# Patient Record
Sex: FEMALE | Race: WHITE | URBAN - METROPOLITAN AREA
[De-identification: names, ages, dates, MRNs, and addresses within clinical notes are randomized per-mention and may not be internally consistent; named-entity substitution may affect disease eponyms.]

---

## 2020-03-20 ENCOUNTER — OFFICE VISIT (OUTPATIENT)
Dept: FAMILY MEDICINE CLINIC | Age: 50
End: 2020-03-20

## 2020-03-20 VITALS
BODY MASS INDEX: 29.98 KG/M2 | TEMPERATURE: 96.7 F | RESPIRATION RATE: 14 BRPM | DIASTOLIC BLOOD PRESSURE: 69 MMHG | OXYGEN SATURATION: 99 % | SYSTOLIC BLOOD PRESSURE: 119 MMHG | HEART RATE: 64 BPM | HEIGHT: 67 IN | WEIGHT: 191 LBS

## 2020-03-20 DIAGNOSIS — R21 RASH: Primary | ICD-10-CM

## 2020-03-20 DIAGNOSIS — R10.816 EPIGASTRIC ABDOMINAL TENDERNESS WITHOUT REBOUND TENDERNESS: ICD-10-CM

## 2020-03-20 DIAGNOSIS — I83.93 VARICOSE VEINS OF BOTH LOWER EXTREMITIES, UNSPECIFIED WHETHER COMPLICATED: ICD-10-CM

## 2020-03-20 RX ORDER — MINERAL OIL
180 ENEMA (ML) RECTAL DAILY
Qty: 90 TAB | Refills: 1 | Status: SHIPPED | OUTPATIENT
Start: 2020-03-20

## 2020-03-20 RX ORDER — PANTOPRAZOLE SODIUM 40 MG/1
40 TABLET, DELAYED RELEASE ORAL DAILY
Qty: 30 TAB | Refills: 0 | Status: SHIPPED | OUTPATIENT
Start: 2020-03-20 | End: 2020-03-25

## 2020-03-20 NOTE — PROGRESS NOTES
1. Have you been to the ER, urgent care clinic since your last visit? Hospitalized since your last visit? No    2. Have you seen or consulted any other health care providers outside of the [address] [place] [address] since your last visit? Include any pap smears or colon screening.  No

## 2020-03-20 NOTE — PATIENT INSTRUCTIONS
Várices: Instrucciones de cuidado Varicose Veins: Care Instructions Instrucciones de cuidado Las várices son Merary Nares que quinones aumentado de [place] y se quinones retorcido cerca de la superficie de la piel. Aparecen con mayor frecuencia en las piernas y los tobillos. Algunas personas podrían ser más propensas que otras a las várices debido al envejecimiento, los cambios hormonales o porque karissa de los padres las tienen. El exceso de peso o el embarazo pueden empeorarlas. Los trabajos que requieren permanecer de pie scott mucho tiempo también pueden empeorarlas. La atención de seguimiento es william parte clave de mcdonald tratamiento y seguridad. Asegúrese de hacer y acudir a todas las citas, y llame a mcdonald médico si está teniendo problemas. También es william buena idea saber los resultados de toni exámenes y mantener william lista de los medicamentos que gabriela. Cómo puede cuidarse en el hogar? · Use medias de compresión scott el día para ayudar a [employer]. Mejoran el flujo de [place] y son el tratamiento principal para las várices. Hable con mcdonald médico sobre qué tipo de medias debe comprar y dónde puede conseguirlas. · Eleve las piernas a la altura o por encima del nivel del corazón cuando sea posible. [place] ayuda a evitar que la reba se acumule en la parte inferior de las piernas y mejora el flujo de reba al chio del cuerpo. · Evite permanecer de pie o sentado scott mucho tiempo. [place] aumenta la tensión [employer]. · Barrera ejercicio de Daria regular y controle mcdonald peso. Janneth, aleks en bicicleta o nade para mejorar el flujo de [employer] piernas. · Si se golpea la pierna con tanta fuerza que sabe que le va a salir un moretón, elévela y aplíquese hielo o william compresa fría en la chyna por entre 10 y 21 minutos cada vez. Trate de hacerlo cada 1 o 2 horas scott los siguientes 3 días (cuando esté despierto) o hasta que la hinchazón baje. Póngase un paño hussein entre el hielo y la piel. · Si se corta o se raspa la piel sobre william vena, podría sangrar bastante. Eleve la pierna y aplique presión firme con william venda limpia sobre el lugar del sangrado. Siga aplicando presión scott 15 minutos. No intente revisar antes de michael tiempo si el sangrado se ha detenido. Si el sangrado no se ha detenido después de 15 minutos, aplique presión de [employer] otros 15 minutos. Puede repetir esto hasta 3 veces scott un total de 45 minutos. Si tiene un coágulo de reba en william várice, podría tener sensibilidad e hinchazón sobre la vena. La vena podría sentirse rígida. Asegúrese de llamar a mcdonald médico de inmediato si tiene estos síntomas. Si mcdonald médico le ha dicho cómo debe cuidar el coágulo, siga toni instrucciones. El cuidado puede incluir lo siguiente: · Eleve la pierna y aplique calor con un paño tibio y húmedo o william almohadilla térmica a baja temperatura (póngase william toalla o paño entre la pierna y la almohadilla para prevenir quemaduras). · Pregúntele a mcdonald médico si puede herbert un analgésico (medicamento para el dolor) de venta gaye, troy acetaminofén (Tylenol), ibuprofeno (Advil, Motrin) o naproxeno (Aleve). Sea stewart con los medicamentos. Erica y siga todas las instrucciones de la [place]. Cuándo debe pedir ayuda? Llame al 911 en cualquier momento que considere que necesita atención de [place]. Por ejemplo, llame si: 
  · Tiene dolor repentino en el pecho y falta de [place], o tose reba.  
 Llame a mcdonald médico ahora mismo o busque atención médica inmediata si: 
  · Tiene señales de un coágulo de [place], troy: ? Dolor en la pantorrilla, el muslo, la tori o detrás de la rodilla. ?  Enrojecimiento e hinchazón en la pierna o la tori.  
  · William várice comienza a sangrar y no puede detener el sangrado.  
  · Tiene un bulto adolorido en la pierna.  
  · Tiene william llaga abierta.  
 Preste especial atención a los cambios en mcdonald jermaine y asegúrese de comunicarse con mcdonald médico si: 
   · Janie síntomas de las várices no mejoran con el tratamiento en el hogar. Dónde puede encontrar más información en inglés? Catherine White a http://[email or web address].info/ Zofia Kassidy U591 en la búsqueda para aprender Canda Lie de \"Várices: Instrucciones de cuidado. \" Revisado: 14 julio, 2019Versión del contenido: 12.4 © 9724-8084 Healthwise, Incorporated. Las instrucciones de cuidado fueron adaptadas bajo licencia por [employer] (which disclaims liability or warranty for this information). Si usted tiene [place] afección médica o sobre estas instrucciones, siempre pregunte a mcdonald profesional de jermaine. Healthwise, Incorporated niega toda garantía o responsabilidad por mcdonald uso de esta información. Análisis de reba para alergias: Acerca de estos análisis Allergy Blood Tests: About These Tests 

Qué son? Los análisis de reba para las alergias sirven para detectar sustancias (anticuerpos) en la reba que el organismo produce en reacción a los alérgenos. Los alérgenos son cosas a las que el organismo tiene alergia. Hay varios tipos de análisis de [place] para las [place], [place] los siguientes: 
· Ensayo inmunoabsorbente ligado a enzimas (MARIE, EIA). · Pruebas radioalergoabsorbentes (RAST). · Prueba de inmunoensayo de captura (ImmunoCAP, UniCAP o Pharmacia CAP). · Pruebas fluor-alergoabsorbentes (FAST). · Prueba simultánea de antígenos múltiples (MAST). Why are these tests done? Los análisis de reba para alergias se hacen para averiguar a qué cosas tiene usted alergia. Cómo se hacen estas pruebas? Un profesional de la jermaine [place] william aguja para herbert william [place] de [place], por lo general, del Rita Jesus. Qué ocurre después de estas pruebas? Los [employer] análisis de reba para la detección de alergias por lo general están disponibles en unos 7 días. Dónde puede encontrar más información en inglés? Catherine White a http://[email or web address].info/ Escriba T573 en la búsqueda para aprender más acerca de \"Análisis de reba para alergias: Acerca de [employer]. \" Revisado: 6 octubre, 2019Versión del contenido: 12.4 © 0188-8498 Healthwise, Incorporated. Las instrucciones de cuidado fueron adaptadas bajo licencia por [employer] Help Conne[employer] (which disclaims liability or warranty for this information). Si usted tiene [place] afección médica o sobre estas instrucciones, siempre pregunte a mcdonald profesional de jermaine. Healthwise, Incorporated niega toda garantía o responsabilidad por mcdonald uso de esta información. Gastritis: Instrucciones de cuidado Gastritis: Care Instructions Instrucciones de cuidado La gastritis es dolor y malestar estomacal. Sucede cuando algo irrita el revestimiento del [facility]. Hay muchas cosas que pueden causarla. Entre estas se incluyen william infección troy la gripe o algo que ha comido o bebido. Los medicamentos o william llaga en el recubrimiento del estómago ([place]) también pueden causarla. Puede tener abotagamiento y dolor abdominal. Podría eructar, vomitar y tener revoltura estomacal. 
Usted debería poder aliviar el problema tomando medicamentos. Y delgado vez sería útil cambiar la alimentación. Si la gastritis continúa, mcdonald médico podría recetarle medicamentos. La atención de seguimiento es william parte clave de mcdonald tratamiento y seguridad. Asegúrese de hacer y acudir a todas las citas, y llame a mcdonald médico si está teniendo problemas. También es william buena idea saber los resultados de toni exámenes y mantener william lista de los medicamentos que gabriela. Cómo puede cuidarse en el hogar? · Si mcdonald médico le recetó antibióticos, tómelos según las indicaciones. No deje de tomarlos por el hecho de sentirse mejor. Debe herbert todos los antibióticos hasta terminarlos. · Sea stewart con los medicamentos. Si mcdonald médico le recetó un medicamento para reducir el ácido estomacal, tómelo según las indicaciones.  Llame a mcdonald médico si se estar teniendo problemas con mcdonald medicamento. · No tome ningún otro medicamento, incluyendo analgésicos (medicamentos para el dolor) de venta gaye, sin consultar con mcdonald médico reema. · Si mcdonald médico le recomienda medicamentos de venta gaye para reducir el ácido estomacal, tales troy Pepcid AC, Prilosec, Tagamet HB o Zantac 75, siga las instrucciones de la etiqueta. · Ana abundantes líquidos (los suficientes troy para que mcdonald orina sea de color amarillo oscar o transparente troy el agua) para prevenir la deshidratación. Elija herbert agua y otros líquidos jose sin cafeína. Si tiene [facility] [employer], el corazón o el hígado y tiene que [place]'s líquidos, hable con mcdonald médico antes de aumentar mcdonald consumo. · Limite la cantidad de alcohol que cruzito. · Evite el café, el té, las bebidas de cola, el chocolate y otros alimentos que contengan cafeína. Aumentan el ácido estomacal. 

Cuándo debe pedir ayuda? Llame al 911 en cualquier momento que considere que necesita atención de [place]. Por ejemplo, llame si: 
  · Vomita reba o algo parecido a granos de café molido.  
  · Janie heces son de color rojizo o muy sanguinolentas (con reba).  
 Llame a mcdonald médico ahora mismo o busque atención médica inmediata si: 
  · Empieza a respirar en forma acelerada y no ha producido orina en las últimas 8 horas.  
  · No puede retener líquidos en el estómago.  
 Preste especial atención a los cambios en mcdonald jermaine y asegúrese de comunicarse con mcdonald médico si: 
  · No mejora troy se esperaba. Dónde puede encontrar más información en inglés? Catherine White a http://[email or web address].info/ Zofia Yi E144 en la búsqueda para aprender más acerca de \"Gastritis: Instrucciones de cuidado. \" Revisado: 11 agosto, 2019Versión del contenido: 12.4 © 7002-1136 Healthwise, Incorporated.  
Las instrucciones de cuidado fueron adaptadas bajo licencia por [employer] Help Conne[employer] (which disclaims liability or warranty for this information). Si usted tiene [place] afección médica o sobre estas instrucciones, siempre pregunte a mcdonald profesional de jermaine. [facility], Incorporated niega toda garantía o responsabilidad por mcdonald uso de esta información. Urticaria: Instrucciones de cuidado Hives: Care Instructions Instrucciones de cuidado La urticaria consiste en manchas rojizas y elevadas en la piel que producen comezón. También se les llama ronchas. Por lo general, tienen los bordes rojizos y el interior pálido. Las ronchas varían en tamaño desde ¼ de pulgada (0.5 cm) a 3 pulgadas (7.5 cm) o más de ancho. Puede parecer que se desplazan de un lugar a otro en la piel. Varias ronchas podrían formar william chyna eliane de piel enrojecida y [place]. Puede tener urticaria después de la picadura de un insecto, herbert un medicamento o comer ciertos alimentos, o debido a william infección o el estrés. [address] [place] plantas, cosas que usted respira, el [place], el calor, el frío, la ceci del sol y el látex. Usted no puede contagiar la urticaria a otras personas. Las ronchas pueden durar de unos pocos minutos a unos cuantos días, jackie william melissa hernandez podría durar menos de 36 horas. La atención de seguimiento es william parte clave de mcdonald tratamiento y seguridad. Asegúrese de hacer y acudir a todas las citas, y llame a mcdonald médico si está teniendo problemas. También es william buena idea saber los resultados de toni exámenes y mantener william lista de los medicamentos que gabriela. Cómo puede cuidarse en el hogar? · Evite cualquier cosa que piense que pudiera traci causado la urticaria, troy ciertos alimentos o medicamentos. Sin embargo, es posible que no conozca la causa. · Colóquese william toalla húmeda fría sobre la chyna afectada para aliviar la comezón.  
· [place] un antihistamínico de venta [place], troy difenhidramina (Benadryl), cetirizina (Zyrtec) o loratadina (Claritin), para ayudar a detener la urticaria y calmar la comezón. Erica y siga las instrucciones de la etiqueta. Estos medicamentos le pueden provocar sueño. No conduzca mientras esté tomando estos medicamentos. · Manténgase alejado de los [place], detergentes y sustancias químicas christel. Estos pueden empeorar la comezón. Cuándo debes pedir ayuda? [place] al 911 en cualquier momento que consideres que necesitas atención de [place]. Por ejemplo, llama si: 
  · Tienes síntomas de william reacción alérgica grave. Estos pueden incluir: 
? Zonas elevadas y enrojecidas (ronchas) que aparecen repentinamente por todo el cuerpo. ? Hinchazón de la garganta, la boca, los labios o la [place]. ? Dificultad para respirar. ? Pérdida del conocimiento [facility]). O podrías sentirte muy aturdido o de repente sentirte débil, confuso o agitado.  
 Llama a tu médico ahora mismo o busca atención médica inmediata si: 
  · Tienes síntomas de william reacción alérgica, tales troy: 
? Salpullido o ronchas (zonas elevadas y enrojecidas en la piel). ? Comezón. ? [place] Chr[place]. ? Dolor abdominal, náuseas o vómito.  
  · Tienes ronchas después de empezar a herbert un nuevo medicamento.  
  · Las ronchas no desaparecen después de 24 horas.  
 Presta especial atención a los cambios en tu jermaine y asegúrate de comunicarte con tu médico si: 
  · No mejoras troy se esperaba. Dónde puede encontrar más información en inglés? Jaswant warner [email or web address].info/ Paul Lux Q042 en la búsqueda para aprender más acerca de \"Urticaria: Instrucciones de cuidado. \" Revisado: 26 junio, 2019Versión del contenido: 12.4 © 8585-9143 Healthwise, DNAe LTD. Las instrucciones de cuidado fueron adaptadas bajo licencia por Good Help Conne[employer] (which disclaims liability or warranty for this information).  Si usted tiene [place] afección médica o sobre estas instrucciones, siempre pregunte a mcdonald profesional de jermaine. [facility], Incorporated niega toda garantía o responsabilidad por mcdonald uso de esta información.

## 2020-03-20 NOTE — PROGRESS NOTES
[facility]    CC: EOC for Rash    HPI:     Rash:  - Timing/onset: Issue started 1-2 years ago  - Location: Can occur anywhere in her body  - Quality: Raised and red   - Progression/Course: Unchanged  - Associated Symptoms/signs: Itching  - Alleviating factors: Zyrtec      Abdominal Pain:  -Started 1 month ago  -Pain is in epigastric region  -Associated with pain going up her throat      ROS: Positive items marked in RED  CON: fever, chills  Cardiovascular: palpitations, CP  Resp: SOB, cough  GI: nausea, vomiting, diarrhea  : dysuria, hematuria      History reviewed. No pertinent past medical history. Past Surgical History:   Procedure Laterality Date    BREAST SURGERY PROCEDURE UNLISTED         Family History   Problem Relation Age of Onset    Cancer Father        Social History     Socioeconomic History    Marital status:      Spouse name: Not on file    Number of children: Not on file    Years of education: Not on file    Highest education level: Not on file   Tobacco Use    Smoking status: Never Smoker    Smokeless tobacco: Never Used   Substance and Sexual Activity    Alcohol use: Not Currently     Frequency: Never    Drug use: Never    Sexual activity: Yes     Partners: Male       No Known Allergies    No current outpatient medications on file.     Physical Exam:      /69   Pulse 64   Temp 96.7 °F (35.9 °C) (Oral)   Resp 14   Ht 5' 7\" (1.702 m)   Wt 191 lb (86.6 kg)   LMP 02/13/2020   SpO2 99%   BMI 29.91 kg/m²     General:  WD, WN, NAD, conversant  Eyes: sclera clear bilaterally, no discharge noted, eyelids normal in appearance  HENT: NCAT  Lungs: CTAB, normal respiratory effort and rate  CV: RRR, no MRGs  ABD: soft, epigastric region mildly tender deep palpation, no rebound tenderness, no guarding, non-distended, normal bowel sounds  Skin: several varicose veins noted in lower extremities bilaterally, normal temperature, turgor, color, and texture  Psych: alert and oriented to person, place and situation, normal affect  Neuro: speech normal, moving all extremities, gait normal      Assessment/Plan     Rash:  -Suspect she has urticaria secondary to some unknown allergen  -Counseled on suspected cause/diagnosis  -Food and airborne allergen profiles ordered  -Started on Allegra regimen  -Handout given on urticaria care and allergy blood test  -Follow-up in 1 month      Epigastric Abdominal Tenderness:  -Suspect she has gastritis  -H. pylori breath test ordered  -Started on Protonix regimen  -Handout given on gastritis care  -Follow-up in 1 month      Varicose Veins of Both Lower Extremities:  -Referred to vascular center for evaluation  -Handout given on varicose vein care  -Follow-up in 1 month        Genevieve Bradley MD  3/20/2020, 11:18 AM

## 2020-03-23 LAB
ALMOND (F20), 16205: 0.28 KU/L
ALTERNARIA ALTERNATA,M6A: <0.1 KU/L
BERMUDA GRASS,G2A: 1.12 KU/L
CASHEW NUT: <0.1 KU/L
CAT DANDER,E1A: <0.1 KU/L
CLADOSPORIUM HERBARU,M2A: <0.1 KU/L
CLASS, 40601940: ABNORMAL
CLASS, 40604340: 0
CLASS, 40607800: 0
CLASS, 40608220: 0
CLASS, 40608550: 0
CLASS, 40609050: ABNORMAL
CLASS, 40613660: ABNORMAL
CLASS, 40620670: 1
CLASS, 40624220: 1
CLASS, 40624480: 1
CLASS, 40624630: 3
CLASS, 40624890: ABNORMAL
CLASS, 40627370: ABNORMAL
CLASS, 40627600: 1
CLASS, 40683380: 2
CLASS: 3
COMMON RAGWEED,W1A: 0.95 KU/L
D. FARINAE,D2A: 3.56 KU/L
DOG DANDER,INH27: 0.13 KU/L
EGG WHITE,F1A: ABNORMAL KU/L
FISH (COD),F3A: <0.1 KU/L
HAZELNUT (FILBERT): 0.26 KU/L
HELICOBACTER PYLORI, UREA BREATH TEST: DETECTED
INTERPRETATION, 55999999: NORMAL
Lab: 0
Lab: 0.89 KU/L
Lab: 1
Lab: 1
Lab: 2
Lab: 2
Lab: <0.1 KU/L
Lab: <0.1 KU/L
Lab: ABNORMAL KU/L
Lab: NORMAL
OAK,T7A: 0.47 KU/L
PEANUT, F13A: 0.62 KU/L
PIGWEED,W14A: 0.63 KU/L
SCALLOP, 069815, FOO56L: 0.51 KU/L
SESAME SEED, FOO62: 0.69 KU/L
SHRIMP,F24A: 8.11 KU/L
SOY,F14A: 0.3 KU/L
WALNUT: 0.29 KU/L
WHEAT,F4A: 0.45 KU/L

## 2020-03-25 DIAGNOSIS — K29.70 HELICOBACTER PYLORI GASTRITIS: Primary | ICD-10-CM

## 2020-03-25 DIAGNOSIS — B96.81 HELICOBACTER PYLORI GASTRITIS: Primary | ICD-10-CM

## 2020-03-25 RX ORDER — OMEPRAZOLE 40 MG/1
40 CAPSULE, DELAYED RELEASE ORAL 2 TIMES DAILY
Qty: 28 CAP | Refills: 0 | Status: SHIPPED | OUTPATIENT
Start: 2020-03-25 | End: 2020-04-08

## 2020-03-25 RX ORDER — CLARITHROMYCIN 500 MG/1
500 TABLET, FILM COATED ORAL 2 TIMES DAILY
Qty: 28 TAB | Refills: 0 | Status: SHIPPED | OUTPATIENT
Start: 2020-03-25 | End: 2020-04-08

## 2020-03-25 RX ORDER — AMOXICILLIN 500 MG/1
1000 CAPSULE ORAL 2 TIMES DAILY
Qty: 56 CAP | Refills: 0 | Status: SHIPPED | OUTPATIENT
Start: 2020-03-25 | End: 2020-04-08